# Patient Record
Sex: FEMALE | Race: WHITE
[De-identification: names, ages, dates, MRNs, and addresses within clinical notes are randomized per-mention and may not be internally consistent; named-entity substitution may affect disease eponyms.]

---

## 2018-02-07 ENCOUNTER — HOSPITAL ENCOUNTER (OUTPATIENT)
Dept: HOSPITAL 75 - RAD | Age: 63
End: 2018-02-07
Attending: NURSE PRACTITIONER
Payer: COMMERCIAL

## 2018-02-07 DIAGNOSIS — Z12.31: Primary | ICD-10-CM

## 2018-02-07 PROCEDURE — 77067 SCR MAMMO BI INCL CAD: CPT

## 2018-02-07 NOTE — DIAGNOSTIC IMAGING REPORT
Digital mammogram bilateral screening.



This study was compared to the prior exams of 12/05/2016,

12/07/2015, and 10/29/2014. At this time, there are no current

complaints.



The current study was also evaluated with a Computer Aided

Detection (CAD) system.



FINDINGS: The fibroglandular tissue in both breasts is

heterogeneously dense. This does limit the sensitivity of this

exam. On the craniocaudal view of the right breast deep in the

mid lateral aspect of the breast approximately 10 cm from nipple

there is a 4 mm nodular density. This finding is difficult to

identify with certainty on the MLO view but seems to persist on

the tomographic images. I suspect that this is either related to

fibroglandular tissue or to a benign process. Even so, I would

recommend that a compression view of this area be obtained in the

CC projection as well as a true lateral view of the right breast

for further study. Ultrasound should also be performed.



The left breast is unchanged.



IMPRESSION: Additional mammographic views and ultrasound of the

right breast will be recommended for further study.



ACR BI-RADS Category 0: Incomplete. (Needs additional imaging

evaluation).

Result letter will be mailed to the patient.

Note: At least 10% of breast cancer is not imaged by mammography.



Dictated by: 



  Dictated on workstation # [id number]

## 2018-03-07 ENCOUNTER — HOSPITAL ENCOUNTER (OUTPATIENT)
Dept: HOSPITAL 75 - RAD | Age: 63
End: 2018-03-07
Attending: NURSE PRACTITIONER
Payer: COMMERCIAL

## 2018-03-07 DIAGNOSIS — N63.10: Primary | ICD-10-CM

## 2018-03-07 NOTE — DIAGNOSTIC IMAGING REPORT
Indication: Right breast density. The patient presents for

additional views.



Correlation is made with recent screening study from 02/07/2018.



The patient returned and a rolled CC as well as spot compression

CC and medial and lateral views of the right breast were obtained

utilizing 3-D mammography. Tiny density in the far posterior

aspect of the right breast slightly lateral to the nipple line

persists. It is uncertain if this represents fibroglandular

tissue versus a true nodule. No other abnormalities are seen.



Impression: BI-RADS 0



Tiny circumscribed density in the far posterior right breast just

lateral to the nipple line. Further evaluation with ultrasound is

recommended.



ACR BI-RADS Category 0: Incomplete. (Needs additional imaging

evaluation).

Result letter will be mailed to the patient.

Note: At least 10% of breast cancer is not imaged by mammography.



Dictated by: 



  Dictated on workstation # [id number]

## 2018-03-07 NOTE — DIAGNOSTIC IMAGING REPORT
Indication: Right breast density. The study is performed for

further evaluation.



Correlation is made with diagnostic mammogram earlier the same

day.



Sonographic interrogation of the outer portion of the right

breast posterior depth was performed. No sonographic abnormality

is seen. No solid or cystic mass is detected to correspond to the

mammographic density.



Impression: BI-RADS category 3



No sonographic abnormality is identified. A followup right

mammogram in 6 months is recommended to confirm stability of the

tiny circumscribed density in the far posterior and outer aspect

of the right breast.



ACR BI-RADS Category 3: Probably benign findings.

Result letter will be mailed to the patient.

Note: At least 10% of breast cancer is not imaged by mammography.



Dictated by: 



  Dictated on workstation # [id number]

## 2019-12-16 ENCOUNTER — HOSPITAL ENCOUNTER (OUTPATIENT)
Dept: HOSPITAL 75 - RAD | Age: 64
End: 2019-12-16
Attending: FAMILY MEDICINE
Payer: COMMERCIAL

## 2019-12-16 DIAGNOSIS — R92.1: Primary | ICD-10-CM

## 2019-12-16 PROCEDURE — 77066 DX MAMMO INCL CAD BI: CPT

## 2019-12-16 NOTE — DIAGNOSTIC IMAGING REPORT
INDICATION: Follow-up right breast density.



Correlation is made with prior mammograms 2/7/2018 and 12/5/2016.



2-D and 3-D bilateral diagnostic mammography was performed with

CAD.



Both breasts remain heterogeneously dense, limiting the

sensitivity of mammography. Tiny density in the far posterior

outer right breast is no longer visualized. No new mass or

malignant appearing microcalcifications are seen. There are

benign calcifications bilaterally. Axillae are unremarkable.



IMPRESSION: BI-RADS Category 2



No mammographic features suspicious for malignancy are

identified.



ACR BI-RADS Category 2: Benign findings.

Result letter will be mailed to the patient.

Note: At least 10% of breast cancer is not imaged by mammography.



Dictated by: 



  Dictated on workstation # [id number]

## 2023-04-17 ENCOUNTER — HOSPITAL ENCOUNTER (OUTPATIENT)
Dept: HOSPITAL 75 - RAD | Age: 68
End: 2023-04-17
Attending: FAMILY MEDICINE
Payer: MEDICARE

## 2023-04-17 DIAGNOSIS — R92.8: Primary | ICD-10-CM

## 2023-04-17 PROCEDURE — 77066 DX MAMMO INCL CAD BI: CPT

## 2023-04-17 PROCEDURE — 77062 BREAST TOMOSYNTHESIS BI: CPT

## 2023-04-17 NOTE — DIAGNOSTIC IMAGING REPORT
INDICATION: Previous abnormal mammogram.



Comparison is made with prior mammogram 12/16/2019 and 2/7/2018.



2-D and 3-D bilateral screening mammography was performed with

CAD.



CAD is utilized.



The current study was also evaluated with a Computer Aided

Detection (CAD) system.



Both breasts are heterogeneously dense, limiting the sensitivity

of mammography. No dominant mass or malignant-appearing

microcalcifications are seen. Axillae are unremarkable.



IMPRESSION: BI-RADS Category 1



No mammographic features suspicious for malignancy are

identified.



ACR BI-RADS Category 1: Negative.

Result letter will be mailed to the patient.

Note:  At least 10% of breast cancer is not imaged by

mammography.



Dictated by: 



  Dictated on workstation # [id number]